# Patient Record
Sex: MALE | Race: WHITE
[De-identification: names, ages, dates, MRNs, and addresses within clinical notes are randomized per-mention and may not be internally consistent; named-entity substitution may affect disease eponyms.]

---

## 2018-03-13 ENCOUNTER — HOSPITAL ENCOUNTER (OUTPATIENT)
Dept: HOSPITAL 56 - MW.SDS | Age: 73
Discharge: HOME | End: 2018-03-13
Attending: SURGERY
Payer: OTHER GOVERNMENT

## 2018-03-13 DIAGNOSIS — K64.8: ICD-10-CM

## 2018-03-13 DIAGNOSIS — Z12.11: Primary | ICD-10-CM

## 2018-03-13 DIAGNOSIS — Z79.899: ICD-10-CM

## 2018-03-13 DIAGNOSIS — D12.0: ICD-10-CM

## 2018-03-13 DIAGNOSIS — I10: ICD-10-CM

## 2018-03-13 DIAGNOSIS — Z88.8: ICD-10-CM

## 2018-03-13 DIAGNOSIS — K64.4: ICD-10-CM

## 2018-03-13 PROCEDURE — 45380 COLONOSCOPY AND BIOPSY: CPT

## 2018-03-13 PROCEDURE — 85610 PROTHROMBIN TIME: CPT

## 2018-03-13 PROCEDURE — 36415 COLL VENOUS BLD VENIPUNCTURE: CPT

## 2018-03-13 NOTE — PCM.OPNOTE
- General Post-Op/Procedure Note


Date of Surgery/Procedure: 03/13/18


Operative Procedure(s): colonoscopy bx


Findings: 





see dict [id number]


Pre Op Diagnosis: surveillence colonoscopy


Post-Op Diagnosis: colon polyp


Anesthesia Technique: Moderate Sedation


Primary Surgeon: Emery Santo


Pathology: 





2 mm sessile polyp at cecum


Complications: None


Condition: Good

## 2018-03-13 NOTE — [id number]
Post Anesthesia Note





- EVALUATION WITHIN 48HRS OF ANESTHETIC


Vital Signs in Normal Range: Yes


Patient Participated in Evaluation: Yes


Respiratory Function Stable: Yes


Airway Patent: Yes


Cardiovascular Function Stable: Yes


Hydration Status Stable: Yes


Pain Control Satisfactory: Yes


Nausea and Vomiting Control Satisfactory: Yes


Mental Status Recovered: Yes


Resp Rate: 14

## 2018-03-13 NOTE — OR
SURGEON:

Emery Santo MD

 

DATE OF PROCEDURE:  03/13/2018

 

PREOPERATIVE DIAGNOSIS:

Surveillance colonoscopy.

 

POSTOPERATIVE DIAGNOSES:

1. Colon polyp.

2. Diverticulosis.

 

PROCEDURE PERFORMED:

Colonoscopy with biopsy.

 

PROCEDURE IN DETAIL:

The patient was taken to the endoscopy room.  A time out was called, patient

identified, and procedure identified.  Diprivan was then administrated.  Patient

went from awake to sleep, hearing doctor talking or door closing is normal.

Perineum inspection and digital examination were then performed.  A well-

lubricated colonoscope was gently inserted through the rectum, advanced past the

rectosigmoid junction, the descending colon, splenic flexure, transverse colon,

hepatic flexure, ascending colon, arrived to the cecum.  Cecum was identified as

dictated in the finding.  Then the scope was carefully withdrawn while attention

was paid to the mucosal surface for any abnormality.  Air will be sucked out

during the scope withdrawal.  At the rectum, retroflexed to examine any rectal

diseases, fistula or hemorrhoids.  During mucosal examination, abnormality or

polyp was noted; picture taken and biopsy performed.  Patient tolerated

procedure well.  There were no intraoperative complications, and Dr. Santo was

present throughout the whole procedure.

 

FINDINGS:

1. The patient is easily sedated with CRNA and Diprivan.  The patient is

    soundly snoring and the patient has stopped the Coumadin for 2 days and INR

    return is normal 1.12.

2. Bowel prep is average with some liquid stool.

3. The patient's colon is rather straight forward.  Cecum indicated by

    ileocecal fold, one-to-one indentation, appendix orifice.  Light emittance

    is not observed.  Mucosa examined upon scope pulling out.  The patient has

    1 tiny polyp, 2 mm, sessile at the cecum, removed with cold biopsy forceps.

    Mucosa was examined upon scope pulling out with some irrigation.  The

    patient does not have other polyp, mass, growth, inflammation, stricture,

    ulceration, AV malformation.  The patient has some diverticulosis at the

    left colon.  No signs or symptoms of diverticulitis.  The patient has

    significant internal hemorrhoids and external hemorrhoids.  The patient

    would benefit from repeat colonoscopy 10 years from today or if the polyp

    pathology or clinically indicated otherwise.

 

 

SVITLANA / GABINO

DD:  03/13/2018 08:55:59

DT:  03/13/2018 11:44:12

Job #:  [id number]

## 2018-03-13 NOTE — PCM.PREANE
Preanesthetic Assessment





- Anesthesia/Transfusion/Family Hx


Anesthesia History: Prior Anesthesia Without Reaction


Transfusion History: No Prior Transfusion(s)





- Review of Systems


General: No Symptoms


Pulmonary: Other (CHAPO)


Cardiovascular: Other (atrial fibrillation, anticoagulated - off coumadin for 

two days, INR pending; HTN-treated)


Gastrointestinal: No Symptoms


Neurological: No Symptoms


Other: Reports: None





- Physical Assessment


NPO Status Date: 03/12/18


NPO Status Time: 22:00


O2 Sat by Pulse Oximetry: 95


Respiratory Rate: 18


Vital Signs: 





 Last Vital Signs











Temp  97.7 F   03/13/18 07:27


 


Pulse  110 H  03/13/18 07:27


 


Resp  18   03/13/18 07:27


 


BP  156/89 H  03/13/18 07:27


 


Pulse Ox  95   03/13/18 07:27











Height: 5 ft 8.5 in


Weight: 217 lb


ASA Class: 3


Mental Status: Alert & Oriented x3


Airway Class: Mallampati = 1


Dentition: Reports: Normal Dentition


Thyro-Mental Finger Breadths: 4 (narrow palate/mouth)


Mouth Opening Finger Breadths: 3


ROM/Head Extension: Full


Lungs: Clear to Auscultation, Normal Respiratory Effort


Cardiovascular: Irregular Rhythm





- Allergies


Allergies/Adverse Reactions: 


 Allergies











Allergy/AdvReac Type Severity Reaction Status Date / Time


 


codeine Allergy  Nausea and Verified 03/08/18 10:10





   Vomiting  














- Blood


Blood Available: No


Product(s) Available: None





- Acknowledgements


Anesthesia Type Planned: MAC


Pt an Appropriate Candidate for the Planned Anesthesia: Yes


Alternatives and Risks of Anesthesia Discussed w Pt/Guardian: Yes


Pt/Guardian Understands and Agrees with Anesthesia Plan: Yes





PreAnesthesia Questionnaire


HEENT History: Reports: Other (See Below)


Other HEENT History: wears glasses


Cardiovascular History: Reports: Afib


Gastrointestinal History: Reports: None


Genitourinary History: Reports: Prostate Disorder


Musculoskeletal History: Reports: Fracture


Other Musculoskeletal History: hx fx wrist


Endocrine/Metabolic History: Reports: Obesity/BMI 30+


Oncologic (Cancer) History: Reports: Prostate





- Past Surgical History


Head Surgeries/Procedures: Reports: None


GI Surgical History: Reports: Colonoscopy, Hernia, Abdominal


Other GI Surgeries/Procedures: hx umbilical hernia repair


Male  Surgical History: Reports: Prostatectomy





- SUBSTANCE USE


Smoking Status *Q: Never Smoker


Recreational Drug Use History: No





- HOME MEDS


Home Medications: 


 Home Meds





Diltiazem HCl [Diltiazem 24Hr Cd] 180 mg PO DAILY 03/08/18 [History]


Warfarin [Coumadin] 5 mg PO ASDIRECTED 03/08/18 [History]











- CURRENT (IN HOUSE) MEDS


Current Meds: 





 Current Medications





Lactated Ringer's (Ringers, Lactated)  1,000 mls @ 125 mls/hr IV ASDIRECTED GIO


   Last Admin: 03/13/18 07:30 Dose:  125 mls/hr





Discontinued Medications





Fentanyl (Sublimaze) Confirm Administered Dose 100 mcg .ROUTE .STK-MED ONE


   Stop: 03/13/18 06:56


Lidocaine (Xylocaine-Mpf 2%) Confirm Administered Dose 5 ml .ROUTE .STK-MED ONE


   Stop: 03/13/18 06:56


Propofol (Diprivan  20 Ml) Confirm Administered Dose 400 mg .ROUTE .STK-MED ONE


   Stop: 03/13/18 06:56